# Patient Record
Sex: MALE | Race: BLACK OR AFRICAN AMERICAN | Employment: UNEMPLOYED | URBAN - METROPOLITAN AREA
[De-identification: names, ages, dates, MRNs, and addresses within clinical notes are randomized per-mention and may not be internally consistent; named-entity substitution may affect disease eponyms.]

---

## 2020-02-09 ENCOUNTER — HOSPITAL ENCOUNTER (EMERGENCY)
Age: 20
Discharge: HOME | End: 2020-02-09

## 2020-02-09 ENCOUNTER — APPOINTMENT (OUTPATIENT)
Dept: CT IMAGING | Age: 20
End: 2020-02-09

## 2020-02-09 VITALS
SYSTOLIC BLOOD PRESSURE: 121 MMHG | HEART RATE: 80 BPM | WEIGHT: 160.94 LBS | BODY MASS INDEX: 22.53 KG/M2 | RESPIRATION RATE: 17 BRPM | HEIGHT: 71 IN | OXYGEN SATURATION: 100 % | TEMPERATURE: 98.2 F | DIASTOLIC BLOOD PRESSURE: 68 MMHG

## 2020-02-09 LAB
A/G RATIO: 1.5 (ref 1.1–2.2)
ALBUMIN SERPL-MCNC: 5 G/DL (ref 3.4–5)
ALP BLD-CCNC: 70 U/L (ref 40–129)
ALT SERPL-CCNC: 19 U/L (ref 10–40)
AMPHETAMINE SCREEN, URINE: ABNORMAL
ANION GAP SERPL CALCULATED.3IONS-SCNC: 13 MMOL/L (ref 3–16)
AST SERPL-CCNC: 23 U/L (ref 15–37)
BARBITURATE SCREEN URINE: ABNORMAL
BASOPHILS ABSOLUTE: 0.1 K/UL (ref 0–0.2)
BASOPHILS RELATIVE PERCENT: 1.4 %
BENZODIAZEPINE SCREEN, URINE: ABNORMAL
BILIRUB SERPL-MCNC: 0.5 MG/DL (ref 0–1)
BILIRUBIN URINE: NEGATIVE
BLOOD, URINE: NEGATIVE
BUN BLDV-MCNC: 7 MG/DL (ref 7–20)
CALCIUM SERPL-MCNC: 9.3 MG/DL (ref 8.3–10.6)
CANNABINOID SCREEN URINE: POSITIVE
CHLORIDE BLD-SCNC: 105 MMOL/L (ref 99–110)
CLARITY: CLEAR
CO2: 26 MMOL/L (ref 21–32)
COCAINE METABOLITE SCREEN URINE: ABNORMAL
COLOR: YELLOW
CREAT SERPL-MCNC: 1 MG/DL (ref 0.9–1.3)
EOSINOPHILS ABSOLUTE: 0.1 K/UL (ref 0–0.6)
EOSINOPHILS RELATIVE PERCENT: 2.3 %
ETHANOL: 253 MG/DL (ref 0–0.08)
GFR AFRICAN AMERICAN: >60
GFR NON-AFRICAN AMERICAN: >60
GLOBULIN: 3.3 G/DL
GLUCOSE BLD-MCNC: 103 MG/DL (ref 70–99)
GLUCOSE URINE: NEGATIVE MG/DL
HCT VFR BLD CALC: 46.6 % (ref 40.5–52.5)
HEMOGLOBIN: 14.8 G/DL (ref 13.5–17.5)
KETONES, URINE: NEGATIVE MG/DL
LACTIC ACID: 2 MMOL/L (ref 0.4–2)
LEUKOCYTE ESTERASE, URINE: NEGATIVE
LYMPHOCYTES ABSOLUTE: 1.7 K/UL (ref 1–5.1)
LYMPHOCYTES RELATIVE PERCENT: 29.1 %
Lab: ABNORMAL
MCH RBC QN AUTO: 24.2 PG (ref 26–34)
MCHC RBC AUTO-ENTMCNC: 31.8 G/DL (ref 31–36)
MCV RBC AUTO: 76 FL (ref 80–100)
METHADONE SCREEN, URINE: ABNORMAL
MICROSCOPIC EXAMINATION: NORMAL
MONOCYTES ABSOLUTE: 0.4 K/UL (ref 0–1.3)
MONOCYTES RELATIVE PERCENT: 6.4 %
NEUTROPHILS ABSOLUTE: 3.6 K/UL (ref 1.7–7.7)
NEUTROPHILS RELATIVE PERCENT: 60.8 %
NITRITE, URINE: NEGATIVE
OPIATE SCREEN URINE: ABNORMAL
OXYCODONE URINE: ABNORMAL
PDW BLD-RTO: 15.8 % (ref 12.4–15.4)
PH UA: 7.5
PH UA: 7.5 (ref 5–8)
PHENCYCLIDINE SCREEN URINE: ABNORMAL
PLATELET # BLD: 276 K/UL (ref 135–450)
PMV BLD AUTO: 8.5 FL (ref 5–10.5)
POTASSIUM REFLEX MAGNESIUM: 4.2 MMOL/L (ref 3.5–5.1)
PROPOXYPHENE SCREEN: ABNORMAL
PROTEIN UA: NEGATIVE MG/DL
RBC # BLD: 6.13 M/UL (ref 4.2–5.9)
SODIUM BLD-SCNC: 144 MMOL/L (ref 136–145)
SPECIFIC GRAVITY UA: 1.01 (ref 1–1.03)
TOTAL PROTEIN: 8.3 G/DL (ref 6.4–8.2)
URINE REFLEX TO CULTURE: NORMAL
URINE TYPE: NORMAL
UROBILINOGEN, URINE: 0.2 E.U./DL
WBC # BLD: 5.9 K/UL (ref 4–11)

## 2020-02-09 PROCEDURE — 85025 COMPLETE CBC W/AUTO DIFF WBC: CPT

## 2020-02-09 PROCEDURE — 80307 DRUG TEST PRSMV CHEM ANLYZR: CPT

## 2020-02-09 PROCEDURE — G0480 DRUG TEST DEF 1-7 CLASSES: HCPCS

## 2020-02-09 PROCEDURE — 2580000003 HC RX 258: Performed by: PHYSICIAN ASSISTANT

## 2020-02-09 PROCEDURE — 6370000000 HC RX 637 (ALT 250 FOR IP): Performed by: PHYSICIAN ASSISTANT

## 2020-02-09 PROCEDURE — 96374 THER/PROPH/DIAG INJ IV PUSH: CPT

## 2020-02-09 PROCEDURE — 70450 CT HEAD/BRAIN W/O DYE: CPT

## 2020-02-09 PROCEDURE — 96376 TX/PRO/DX INJ SAME DRUG ADON: CPT

## 2020-02-09 PROCEDURE — 6360000002 HC RX W HCPCS: Performed by: PHYSICIAN ASSISTANT

## 2020-02-09 PROCEDURE — 36415 COLL VENOUS BLD VENIPUNCTURE: CPT

## 2020-02-09 PROCEDURE — 99284 EMERGENCY DEPT VISIT MOD MDM: CPT

## 2020-02-09 PROCEDURE — 81003 URINALYSIS AUTO W/O SCOPE: CPT

## 2020-02-09 PROCEDURE — 80053 COMPREHEN METABOLIC PANEL: CPT

## 2020-02-09 PROCEDURE — 83605 ASSAY OF LACTIC ACID: CPT

## 2020-02-09 RX ORDER — 0.9 % SODIUM CHLORIDE 0.9 %
1000 INTRAVENOUS SOLUTION INTRAVENOUS ONCE
Status: COMPLETED | OUTPATIENT
Start: 2020-02-09 | End: 2020-02-09

## 2020-02-09 RX ORDER — ACETAMINOPHEN 500 MG
1000 TABLET ORAL ONCE
Status: COMPLETED | OUTPATIENT
Start: 2020-02-09 | End: 2020-02-09

## 2020-02-09 RX ORDER — ONDANSETRON 2 MG/ML
4 INJECTION INTRAMUSCULAR; INTRAVENOUS ONCE
Status: COMPLETED | OUTPATIENT
Start: 2020-02-09 | End: 2020-02-09

## 2020-02-09 RX ADMIN — ONDANSETRON 4 MG: 2 INJECTION INTRAMUSCULAR; INTRAVENOUS at 11:38

## 2020-02-09 RX ADMIN — ONDANSETRON 4 MG: 2 INJECTION INTRAMUSCULAR; INTRAVENOUS at 06:22

## 2020-02-09 RX ADMIN — ACETAMINOPHEN 1000 MG: 500 TABLET ORAL at 11:38

## 2020-02-09 RX ADMIN — SODIUM CHLORIDE 1000 ML: 9 INJECTION, SOLUTION INTRAVENOUS at 06:22

## 2020-02-09 ASSESSMENT — PAIN SCALES - GENERAL: PAINLEVEL_OUTOF10: 8

## 2020-02-09 NOTE — ED PROVIDER NOTES
[address] [facility]      Pt Name: Cheri Almonte  MRN: [id number]  [place] 2000  Date of evaluation: 2/9/2020  Provider: Lannie Bence, PA-C    This patient was not seen and evaluated by the attending physician No att. providers found. CHIEF COMPLAINT       Chief Complaint   Patient presents with    Emesis    Shaking         HISTORYOF PRESENT ILLNESS  (Location/Symptom, Timing/Onset, Context/Setting, Quality, Duration, Modifying Factors, Severity.)   Cheri Almonte is a 22 y.o. male who presents to the emergency department with altered mental status. The patient arrives via EMS accompanied by his girlfriend who provides the entire history. She states he is intoxicated and drank alcohol from 1-4 AM. She called the paramedics because his speech was slurred, body was tremulous and he was \"frothing at the mouth\". He also fell to the ground. No LOC. Upon paramedic arrival, they deny any seizure activity. No interventions were given. He is sleeping now and wakes to verbal stimuli and sternal rub but does not stay awake long enough to answer questions or follow commands. The patient's girlfriend denies seizure activity today, but interestingly notes that she believes he had a seizure a week ago. At that time his entire body was shaking and he was unresponsive during the episode. It took his 5 minutes to regain full consciousness and answer questions. Prior to this he had never had a seizure as far as she knows. He does have a history of schizophrenia but does not take the medication he was prescribed for it. Nursing Notes were reviewedand I agree. REVIEW OF SYSTEMS    (2-9 systems for level 4, 10 or more forlevel 5)     Review of Systems   Unable to perform ROS: Mental status change   Patient does not wake long enough to answer questions  Except as noted above the remainder ofthe review of systems was reviewed and negative.        PAST assess due to patient condition            DIAGNOSTIC RESULTS     RADIOLOGY:   Non-plain film images such as CT, Ultrasound and MRI are read by the radiologist.Plain radiographic images are visualized and preliminarily interpreted by Stephany Knutson PA-C with the below findings:        Interpretation per the Radiologist below, if available at the time of this note:    CT Head WO Contrast   Final Result   No acute intracranial abnormality.              LABS:  Labs Reviewed   CBC WITH AUTO DIFFERENTIAL - Abnormal; Notable for the following components:       Result Value    RBC 6.13 (*)     MCV 76.0 (*)     MCH 24.2 (*)     RDW 15.8 (*)     All other components within normal limits    Narrative:     Performed at:  [address] [place] [address]   Phone [phone]   COMPREHENSIVE METABOLIC PANEL W/ REFLEX TO MG FOR LOW K - Abnormal; Notable for the following components:    Glucose 103 (*)     Total Protein 8.3 (*)     All other components within normal limits    Narrative:     Performed at:  [address] [place] [address]   Phone [phone]   [address] - Abnormal; Notable for the following components:    Cannabinoid Scrn, Ur POSITIVE (*)     All other components within normal limits    Narrative:     Performed at:  [facility]  [address] [place] [address]   Phone [phone]   ETHANOL    Narrative:     Performed at:  [facility]  [address] [address] [place] [address]   Phone [phone]   LACTIC ACID, PLASMA    Narrative:     Performed at:  [facility]  [address] [place] [address]   Phone [phone]   URINE RT REFLEX TO CULTURE    Narrative:     Performed at:  [facility]  [address] [place] [address] [place] [address]   Phone [phone]       All other labs were within normal range or not returned as of this dictation. EMERGENCY DEPARTMENT COURSE and DIFFERENTIAL DIAGNOSIS/MDM:   Vitals:    Vitals:    02/09/20 0830 02/09/20 0900 02/09/20 1030 02/09/20 1100   BP: 109/61 126/73 122/68 122/69   Pulse: 88 82 80 69   Resp: 21 19 20 25   Temp:       TempSrc:       SpO2: 99% 100% 97% 100%   Weight:       Height:            I have evaluated this patient. My supervising physician was available for consultation. Patient is clearly intoxicated. During his stay he got out of bed and urinated on the ground. He was given a liter of fluids and Zofran. His work-up is thus far normal.  His lactic acid is 2 and I have no suspicion for seizure activity given this. Hemoglobin is stable at 14.8. He has a normal white blood cell count at 5.9. Electrolytes are normal.  Renal function normal.  Ethanol level is 253. CT head was obtained secondary to possible trauma when he fell and this was negative. Currently waiting on the patient to provide a urine sample for a urine tox screen. The patient will be observed in the emergency department until he is sober enough to go home safely. Update 11:19 AM the patient was able to provide a urine sample and this is being sent. He complained of some further nausea and was given a second dose of IV Zofran, and also complained of a headache so Tylenol was ordered. The patient is now awake and alert and oriented x3. He knows he is here because \"I was drunk\". He is now requesting discharge. I think that is fine as long as he is able to ambulate. I will have our nursing staff ambulate the patient and as long as he is steady on his feet he will be discharged in the care of his friend who is with him. Urine tox screen positive for cannibinoid use. UA negative for ketones or infection. Discussed results, diagnosis and plan with patient and/or family. Questions addressed.  Dispositionand follow-up

## 2020-02-09 NOTE — ED TRIAGE NOTES
Girlfriend at  states pt had some tremors after a night of drinking with friends, states pt also had some emesis. Girlfriend is concerned patient might of had a seizure. Pt has no history of seizures. Pt is noncompliant with schizophrenia medications.

## 2020-10-28 ENCOUNTER — HOSPITAL ENCOUNTER (EMERGENCY)
Age: 20
Discharge: HOME | End: 2020-10-28
Attending: EMERGENCY MEDICINE
Payer: COMMERCIAL

## 2020-10-28 ENCOUNTER — APPOINTMENT (OUTPATIENT)
Dept: GENERAL RADIOLOGY | Age: 20
End: 2020-10-28
Payer: COMMERCIAL

## 2020-10-28 VITALS
DIASTOLIC BLOOD PRESSURE: 78 MMHG | TEMPERATURE: 98.7 F | OXYGEN SATURATION: 99 % | WEIGHT: 152.78 LBS | BODY MASS INDEX: 21.31 KG/M2 | HEART RATE: 97 BPM | RESPIRATION RATE: 18 BRPM | SYSTOLIC BLOOD PRESSURE: 129 MMHG

## 2020-10-28 PROCEDURE — 99282 EMERGENCY DEPT VISIT SF MDM: CPT

## 2020-10-28 ASSESSMENT — PAIN DESCRIPTION - PAIN TYPE: TYPE: ACUTE PAIN

## 2020-10-28 ASSESSMENT — PAIN SCALES - GENERAL: PAINLEVEL_OUTOF10: 8

## 2020-10-28 ASSESSMENT — PAIN DESCRIPTION - ORIENTATION: ORIENTATION: LEFT

## 2020-10-28 ASSESSMENT — PAIN DESCRIPTION - DESCRIPTORS: DESCRIPTORS: ACHING;THROBBING

## 2020-10-28 ASSESSMENT — PAIN DESCRIPTION - LOCATION: LOCATION: SHOULDER

## 2020-10-28 NOTE — ED PROVIDER NOTES
Social Needs    Financial resource strain: None    Food insecurity     Worry: None     Inability: None    Transportation needs     Medical: None     Non-medical: None   Tobacco Use    Smoking status: Current Every Day Smoker     Types: Cigars    Smokeless tobacco: Never Used   Substance and Sexual Activity    Alcohol use: Yes     Alcohol/week: 2.0 standard drinks     Types: 2 Shots of liquor per week    Drug use: No    Sexual activity: Yes     Partners: Female   Lifestyle    Physical activity     Days per week: None     Minutes per session: None    Stress: None   Relationships    Social connections     Talks on phone: None     Gets together: None     Attends Yazdanism service: None     Active member of club or organization: None     Attends meetings of clubs or organizations: None     Relationship status: None    Intimate partner violence     Fear of current or ex partner: None     Emotionally abused: None     Physically abused: None     Forced sexual activity: None   Other Topics Concern    None   Social History Narrative    None       SCREENINGS             PHYSICAL EXAM    (up to 7 for level 4, 8 ormore for level 5)     ED Triage Vitals [10/28/20 1651]   BP Temp Temp Source Pulse Resp SpO2 Height Weight   129/78 98.7 °F (37.1 °C) Oral 97 18 99 % -- 152 lb 12.5 oz (69.3 kg)       Physical Exam  Vitals signs and nursing note reviewed. Constitutional:       General: He is not in acute distress. Appearance: He is well-developed. He is not ill-appearing, toxic-appearing or diaphoretic. Comments: Well-appearing, nontoxic, not in acute distress. Answers questions in full sentences and following verbal commands appropriately   HENT:      Head: Normocephalic and atraumatic. Eyes:      General:         Right eye: No discharge. Left eye: No discharge. Conjunctiva/sclera: Conjunctivae normal.   Neck:      Musculoskeletal: Normal range of motion and neck supple.    Pulmonary: Effort: Pulmonary effort is normal. No respiratory distress. Musculoskeletal:      Left shoulder: He exhibits decreased range of motion, tenderness and pain. He exhibits no crepitus, no deformity and normal pulse. Skin:     Coloration: Skin is not pale. Neurological:      Mental Status: He is alert and oriented to person, place, and time. Psychiatric:         Behavior: Behavior normal. Behavior is cooperative. DIAGNOSTIC RESULTS     EKG: All EKG's are interpreted by the Emergency Department Physicianwho either signs or Co-signs this chart in the absence of a cardiologist.      RADIOLOGY:   Non-plain film images such as CT, Ultrasound and MRI are read by the radiologist. Plain radiographic images are visualized and preliminarily interpreted by the emergency physician with the below findings:      Interpretation per the Radiologist below, if available at the time of this note:    No orders to display         ED BEDSIDE ULTRASOUND:   Performed by ED Physician - none    LABS:  Labs Reviewed - No data to display    All other labs were within normal range ornot returned as of this dictation. EMERGENCY DEPARTMENT COURSE and DIFFERENTIAL DIAGNOSIS/MDM:   Vitals:    Vitals:    10/28/20 1651   BP: 129/78   Pulse: 97   Resp: 18   Temp: 98.7 °F (37.1 °C)   TempSrc: Oral   SpO2: 99%   Weight: 152 lb 12.5 oz (69.3 kg)         MDM    ED COURSE/MDM    -Juju Rios is a 23 y.o. male with significant medical history presents ED for left shoulder pain. Patient states he was wrestling with his brother 2 days ago when he injured it. Is concerned for a shoulder dislocation as he states that he is unable to full range of motion to his left shoulder without it causing pain. On examination patient able to extend shoulder up to 90 degrees however is unable to do so beyond that due to pain. Admitted abduction though no issues with abduction. Intact external and internal rotation. No obvious deformity noted. -Ordered 600 mg Motrin. -Xray of left shoulder ordered  -Nurse Informs me that patient is no longer in his room around 1800. Had not returned by 21 [phone] and therefore marked as eloped. REASSESSMENT      Eloped prior to re-evaluation    CRITICAL CARE TIME   Total Critical Care time was 0 minutes, excluding separately reportableprocedures. There was a high probability of clinicallysignificant/life threatening deterioration in the patient's condition which required my urgent intervention. CONSULTS:  None    PROCEDURES:  Unless otherwise noted below, none     Procedures    FINAL IMPRESSION      1. Acute pain of left shoulder          DISPOSITION/PLAN   DISPOSITION Eloped - Left Before Treatment Complete 10/28/2020 06:22:16 PM      PATIENT REFERREDTO:  No follow-up provider specified.     DISCHARGE MEDICATIONS:  Discharge Medication List as of 10/28/2020  6:24 PM             (Please note that portions of this note were completed with a voice recognition program.  Efforts were made to edit the dictations but occasionally wordsare mis-transcribed.)    Fifi Martinez MD (electronically signed)  Attending Emergency Physician            Fifi Martinez MD  10/28/20 2545

## 2021-06-21 ENCOUNTER — HOSPITAL ENCOUNTER (EMERGENCY)
Age: 21
Discharge: HOME | End: 2021-06-21
Attending: EMERGENCY MEDICINE
Payer: COMMERCIAL

## 2021-06-21 ENCOUNTER — APPOINTMENT (OUTPATIENT)
Dept: CT IMAGING | Age: 21
End: 2021-06-21
Payer: COMMERCIAL

## 2021-06-21 VITALS
HEART RATE: 90 BPM | RESPIRATION RATE: 18 BRPM | DIASTOLIC BLOOD PRESSURE: 100 MMHG | OXYGEN SATURATION: 100 % | SYSTOLIC BLOOD PRESSURE: 138 MMHG | BODY MASS INDEX: 20.92 KG/M2 | TEMPERATURE: 98.8 F | WEIGHT: 150 LBS

## 2021-06-21 DIAGNOSIS — S01.81XA FOREHEAD LACERATION, INITIAL ENCOUNTER: Primary | ICD-10-CM

## 2021-06-21 DIAGNOSIS — S09.90XA INJURY OF HEAD, INITIAL ENCOUNTER: ICD-10-CM

## 2021-06-21 PROCEDURE — 12002 RPR S/N/AX/GEN/TRNK2.6-7.5CM: CPT

## 2021-06-21 PROCEDURE — 72125 CT NECK SPINE W/O DYE: CPT

## 2021-06-21 PROCEDURE — 99284 EMERGENCY DEPT VISIT MOD MDM: CPT

## 2021-06-21 PROCEDURE — 70486 CT MAXILLOFACIAL W/O DYE: CPT

## 2021-06-21 PROCEDURE — 12011 RPR F/E/E/N/L/M 2.5 CM/<: CPT

## 2021-06-21 PROCEDURE — 70450 CT HEAD/BRAIN W/O DYE: CPT

## 2021-06-21 NOTE — ED NOTES
Pressure dressing applied after verifying with [employer] that is possibly permissible, patient tolerated procedure well, reviewed instructions with [employer], very under, to brian per [employer]     Carl Moffett RN  06/21/21 9344

## 2021-06-21 NOTE — ED PROVIDER NOTES
[address]  Chief Complaint   Patient presents with    Laceration     head, hit head against partition of police car, patient arrives to ER with spit shield in place, initially patient refuse treatment, EMD to bedside, patient finally agree, lac noted to top of forehead, denies LOC     HISTORY OF PRESENT ILLNESS  Claude Rao is a 19 y.o. male who presents to the ED complaining of frontal scalp laceration and forehead laceration. He arrives in police custody, initially agitated with a spit shield in place and initially refusing treatment but was agreeable when I evaluated him to let me evaluate his wounds and calmed down significantly. He does admit to alcohol and marijuana use to me tonight but denies any other illicit drug use. He is currently under arrest per the police who are with him. His tetanus is up-to-date. He sustained the injury from apparently hitting his head on the partition in the police car x2 sustaining a laceration with each time he hit his head. No loss of consciousness or vomiting. No other complaints, modifying factors or associated symptoms. Nursing notes reviewed. Past Medical History:   Diagnosis Date    Asthma      History reviewed. No pertinent surgical history. History reviewed. No pertinent family history. Social History     Socioeconomic History    Marital status: Single     Spouse name: Not on file    Number of children: Not on file    Years of education: Not on file    Highest education level: Not on file   Occupational History    Not on file   Tobacco Use    Smoking status: Current Every Day Smoker     Types: Cigars    Smokeless tobacco: Never Used   Vaping Use    Vaping Use: Never used   Substance and Sexual Activity    Alcohol use:  Yes     Alcohol/week: 2.0 standard drinks     Types: 2 Shots of liquor per week    Drug use: No    Sexual activity: Yes     Partners: Female   Other Topics Concern  Not on file   Social History Narrative    Not on file     Social Determinants of Health     Financial Resource Strain:     Difficulty of Paying Living Expenses:    Food Insecurity:     Worried About Running Out of Food in the Last Year:     [address] in the Last Year:    Transportation Needs:     Lack of Transportation (Medical):  Lack of Transportation (Non-Medical):    Physical Activity:     Days of Exercise per Week:     Minutes of Exercise per Session:    Stress:     Feeling of Stress :    Social Connections:     Frequency of Communication with Friends and Family:     Frequency of Social Gatherings with Friends and Family:     Attends Gnosticism Services:     Active Member of Clubs or Organizations:     Attends Club or Organization Meetings:     Marital Status:    Intimate Partner Violence:     Fear of Current or Ex-Partner:     Emotionally Abused:     Physically Abused:     Sexually Abused:      No current facility-administered medications for this encounter. Current Outpatient Medications   Medication Sig Dispense Refill    ibuprofen (ADVIL;MOTRIN) 600 MG tablet Take 1 tablet by mouth every 6 hours as needed for Pain 20 tablet 0     No Known Allergies    REVIEW OF SYSTEMS  6 systems reviewed, pertinent positives per HPI otherwise noted to be negative    PHYSICAL EXAM   BP (!) 138/100   Pulse 90   Temp 98.8 °F (37.1 °C) (Oral)   Resp 18   Wt 150 lb (68 kg)   SpO2 100%   BMI 20.92 kg/m²    GENERAL APPEARANCE: Awake and alert. Cooperative. No acute distress. HEAD: Normocephalic. 2.5 cm superficial transverse laceration noted to the middle of the forehead with no active bleeding. Superior to this near the hairline is a 5.5 cm superficial laceration with one small arterial component of bleeding noted, controlled with pressure initially, also transverse along the hairline. No other craniofacial lacerations are apparent. He has no bony tenderness on the face.   He does review. Dose modulation, iterative reconstruction, and/or weight based adjustment of the mA/kV was utilized to reduce the radiation dose to as low as reasonably achievable. COMPARISON: None. HISTORY: ORDERING SYSTEM PROVIDED HISTORY: frontal trauma TECHNOLOGIST PROVIDED HISTORY: Reason for exam:->frontal trauma Has a \"code stroke\" or \"stroke alert\" been called? ->No Decision Support Exception - unselect if not a suspected or confirmed emergency medical condition->Emergency Medical Condition (MA) Reason for Exam: Frontal trauma - Best images possible Acuity: Acute Type of Exam: Subsequent/Follow-up Mechanism of Injury: Laceration (head, hit head against partition of police car, patient arrives to ER with spit shield in place, initially patient refuse treatment, EMD to bedside, patient finally agree, lac noted to top of forehead, denies LOC) Relevant Medical/Surgical History: Current Every Day Smoker; 2.0 standard drinks of alcohol/week. No hx of any relevant surgeries or of any cancer. FINDINGS: Head and facial bones: BRAIN/VENTRICLES: There is no acute intracranial hemorrhage, mass effect or midline shift. No abnormal extra-axial fluid collection. The gray-white differentiation is maintained without evidence of an acute infarct. There is no evidence of hydrocephalus. ORBITS: The bilateral globes are intact. SINUSES: Mild mucoperiosteal thickening of the right ethmoid air cells is seen. Other sinuses and mastoid air cells are clear. SOFT TISSUES/BONES:  No acute fracture or dislocation in the facial and cranial bones. Diffuse right frontoparietal scalp swelling/hematoma. No acute intracranial abnormality. No acute fracture or dislocation in the facial and cranial bones. No acute fracture or subluxation in the cervical spine.      CT FACIAL BONES WO CONTRAST    Result Date: 6/21/2021  EXAMINATION: CT OF THE HEAD WITHOUT CONTRAST; CT OF THE FACE WITHOUT CONTRAST; CT OF THE CERVICAL SPINE WITHOUT CONTRAST 6/21/2021 2:07 am TECHNIQUE: CT of the head was performed without the administration of intravenous contrast. Dose modulation, iterative reconstruction, and/or weight based adjustment of the mA/kV was utilized to reduce the radiation dose to as low as reasonably achievable.; CT of the face was performed without the administration of intravenous contrast. Multiplanar reformatted images are provided for review. Dose modulation, iterative reconstruction, and/or weight based adjustment of the mA/kV was utilized to reduce the radiation dose to as low as reasonably achievable.; CT of the cervical spine was performed without the administration of intravenous contrast. Multiplanar reformatted images are provided for review. Dose modulation, iterative reconstruction, and/or weight based adjustment of the mA/kV was utilized to reduce the radiation dose to as low as reasonably achievable. COMPARISON: None. HISTORY: ORDERING SYSTEM PROVIDED HISTORY: frontal trauma TECHNOLOGIST PROVIDED HISTORY: Reason for exam:->frontal trauma Has a \"code stroke\" or \"stroke alert\" been called? ->No Decision Support Exception - unselect if not a suspected or confirmed emergency medical condition->Emergency Medical Condition (MA) Reason for Exam: Frontal trauma - Best images possible Acuity: Acute Type of Exam: Subsequent/Follow-up Mechanism of Injury: Laceration (head, hit head against partition of police car, patient arrives to ER with spit shield in place, initially patient refuse treatment, EMD to bedside, patient finally agree, lac noted to top of forehead, denies LOC) Relevant Medical/Surgical History: Current Every Day Smoker; 2.0 standard drinks of alcohol/week. No hx of any relevant surgeries or of any cancer. FINDINGS: Head and facial bones: BRAIN/VENTRICLES: There is no acute intracranial hemorrhage, mass effect or midline shift. No abnormal extra-axial fluid collection.   The gray-white differentiation is maintained without evidence of an acute infarct. There is no evidence of hydrocephalus. ORBITS: The bilateral globes are intact. SINUSES: Mild mucoperiosteal thickening of the right ethmoid air cells is seen. Other sinuses and mastoid air cells are clear. SOFT TISSUES/BONES:  No acute fracture or dislocation in the facial and cranial bones. Diffuse right frontoparietal scalp swelling/hematoma. No acute intracranial abnormality. No acute fracture or dislocation in the facial and cranial bones. No acute fracture or subluxation in the cervical spine. CT CERVICAL SPINE WO CONTRAST    Result Date: 6/21/2021  EXAMINATION: CT OF THE HEAD WITHOUT CONTRAST; CT OF THE FACE WITHOUT CONTRAST; CT OF THE CERVICAL SPINE WITHOUT CONTRAST  6/21/2021 2:07 am TECHNIQUE: CT of the head was performed without the administration of intravenous contrast. Dose modulation, iterative reconstruction, and/or weight based adjustment of the mA/kV was utilized to reduce the radiation dose to as low as reasonably achievable.; CT of the face was performed without the administration of intravenous contrast. Multiplanar reformatted images are provided for review. Dose modulation, iterative reconstruction, and/or weight based adjustment of the mA/kV was utilized to reduce the radiation dose to as low as reasonably achievable.; CT of the cervical spine was performed without the administration of intravenous contrast. Multiplanar reformatted images are provided for review. Dose modulation, iterative reconstruction, and/or weight based adjustment of the mA/kV was utilized to reduce the radiation dose to as low as reasonably achievable. COMPARISON: None. HISTORY: ORDERING SYSTEM PROVIDED HISTORY: frontal trauma TECHNOLOGIST PROVIDED HISTORY: Reason for exam:->frontal trauma Has a \"code stroke\" or \"stroke alert\" been called? ->No Decision Support Exception - unselect if not a suspected or confirmed emergency medical condition->Emergency Medical Condition (MA) Reason for Secondary survey did not reveal any other significant injuries. The wounds were cleaned and closed per the procedure note below. CT of the head, facial bones and cervical spine were all unremarkable. Sutures out in about 10 days. No indication for empiric antibiotics. Laceration Repair Procedure Note    Indication: Laceration(s)    Consent: The patient was counseled regarding the procedure, it's indications, risks, potential complications and alternatives and any questions were answered. Consent was obtained. Location: Forehead (2.5cm) and along the frontal hairline (5.5cm)    Shape: Forehead (linear) and frontal hairline (curvilinear)    Procedure: The patient was placed in the appropriate position and anesthesia around the lacerations were obtained by infiltration using 1% Lidocaine without epinephrine. The area was then cleaned with hibiclens and draped in a sterile fashion. The wound was then irrigated with normal saline. The wound was fully explored in a bloodless field and no foreign bodies were found. The FOREHEAD laceration was closed with 4-0 ethilon using interrupted sutures, x3. The SUPERIOR FRONTAL HAIRLINE laceration was closed with 4-0 ethilon using interrupted sutures, x5. The wound area was then dressed with gauze and pressure bandage    Total repaired wound length: 8 cm. For reference, laceration length cut-offs for billing purposes are  by lengths of 0-2.5cm, 2.6-5cm, 5.1-7.5cm, and 7.6-12.5cm. Wound classification:  intermediate due to single layer closure requiring copious irrigation    Suture/staple count: total suture count 8    The patient tolerated the procedure well. The patients tetanus status was up to date and did not require a booster dose. Complications: None        CLINICAL IMPRESSION  1. Forehead laceration, initial encounter    2.  Injury of head, initial encounter        Blood pressure (!) 138/100, pulse 90, temperature 98.8 °F (37.1 °C), temperature source Oral, resp. rate 18, weight 150 lb (68 kg), SpO2 100 %. DISPOSITION    I have discussed the findings of today's workup with the patient and addressed the patient's questions and concerns. Important warning signs as well as new or worsening symptoms which would necessitate immediate return to the ED were discussed. The plan is to discharge from the ED at this time, and the patient is in stable condition. The patient acknowledged understanding is agreeable with this plan. Follow-up with:  [facility] [id number]  [phone]  Go to   If symptoms worsen    SUTURES OUT IN 10 DAYS            This chart was created using Dragon dictation software. Efforts were made by me to ensure accuracy, however some errors may be present due to limitations of this technology.         Felix Nelson MD  06/21/21 2284

## 2021-06-21 NOTE — ED NOTES
Return to room from [address] per stretcher, accompanied by [employer] x2      Cassy Aguilera RN  06/21/21 4707

## 2021-06-21 NOTE — ED NOTES
Suturing completed, patient tolerated procedure well, wounds cleansed, PSO applied     Jaja Busch RN  06/21/21 7162  Aware waiting for CT results     Jaja Busch RN  06/21/21 2420

## 2021-06-21 NOTE — ED TRIAGE NOTES
Nurse arrived to room wearing face mask, safety goggles and gloves, patient arrived wearing spit shield,

## 2024-10-23 ENCOUNTER — APPOINTMENT (OUTPATIENT)
Dept: CT IMAGING | Age: 24
End: 2024-10-23
Payer: COMMERCIAL

## 2024-10-23 ENCOUNTER — APPOINTMENT (OUTPATIENT)
Dept: GENERAL RADIOLOGY | Age: 24
End: 2024-10-23
Payer: COMMERCIAL

## 2024-10-23 ENCOUNTER — HOSPITAL ENCOUNTER (EMERGENCY)
Age: 24
Discharge: HOME | End: 2024-10-23
Attending: EMERGENCY MEDICINE
Payer: COMMERCIAL

## 2024-10-23 VITALS
RESPIRATION RATE: 17 BRPM | DIASTOLIC BLOOD PRESSURE: 73 MMHG | OXYGEN SATURATION: 99 % | SYSTOLIC BLOOD PRESSURE: 123 MMHG | HEART RATE: 69 BPM | TEMPERATURE: 97.6 F
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
## 2024-10-23 DIAGNOSIS — S09.90XD CLOSED HEAD INJURY, SUBSEQUENT ENCOUNTER: ICD-10-CM
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
## 2024-10-23 PROCEDURE — 90471 IMMUNIZATION ADMIN: CPT | Performed by: EMERGENCY MEDICINE

## 2024-10-23 PROCEDURE — 73060 X-RAY EXAM OF HUMERUS: CPT

## 2024-10-23 PROCEDURE — 6370000000 HC RX 637 (ALT 250 FOR IP): Performed by: EMERGENCY MEDICINE

## 2024-10-23 PROCEDURE — 72125 CT NECK SPINE W/O DYE: CPT

## 2024-10-23 PROCEDURE — 99284 EMERGENCY DEPT VISIT MOD MDM: CPT

## 2024-10-23 PROCEDURE — 90715 TDAP VACCINE 7 YRS/> IM: CPT | Performed by: EMERGENCY MEDICINE

## 2024-10-23 PROCEDURE — 70450 CT HEAD/BRAIN W/O DYE: CPT

## 2024-10-23 PROCEDURE — 6360000002 HC RX W HCPCS: Performed by: EMERGENCY MEDICINE

## 2024-10-23 RX ORDER — ACETAMINOPHEN 500 MG
1000 TABLET ORAL ONCE
Status: COMPLETED | OUTPATIENT
Start: 2024-10-23 | End: 2024-10-23

## 2024-10-23 RX ORDER — IBUPROFEN 600 MG/1
600 TABLET, FILM COATED ORAL 3 TIMES DAILY PRN
Qty: 30 TABLET | Refills: 0 | Status: SHIPPED | OUTPATIENT
Start: 2024-10-23

## 2024-10-23 RX ADMIN — AMOXICILLIN AND CLAVULANATE POTASSIUM 1 TABLET: 875; 125 TABLET, FILM COATED ORAL at 19:52

## 2024-10-23 RX ADMIN — ACETAMINOPHEN 1000 MG: 500 TABLET ORAL at 19:52

## 2024-10-23 RX ADMIN — TETANUS TOXOID, REDUCED DIPHTHERIA TOXOID AND ACELLULAR PERTUSSIS VACCINE, ADSORBED 0.5 ML: 5; 2.5; 8; 8; 2.5 SUSPENSION INTRAMUSCULAR at 19:58

## 2024-10-23 ASSESSMENT — PAIN DESCRIPTION - LOCATION: LOCATION: HEAD;ARM

## 2024-10-23 ASSESSMENT — ENCOUNTER SYMPTOMS
SHORTNESS OF BREATH: 0
GASTROINTESTINAL NEGATIVE: 1
ABDOMINAL PAIN: 0
EYES NEGATIVE: 1
BACK PAIN: 0
RESPIRATORY NEGATIVE: 1

## 2024-10-23 ASSESSMENT — PAIN - FUNCTIONAL ASSESSMENT: PAIN_FUNCTIONAL_ASSESSMENT: 0-10

## 2024-10-23 ASSESSMENT — LIFESTYLE VARIABLES
HOW MANY STANDARD DRINKS CONTAINING ALCOHOL DO YOU HAVE ON A TYPICAL DAY: PATIENT DOES NOT DRINK
HOW OFTEN DO YOU HAVE A DRINK CONTAINING ALCOHOL: MONTHLY OR LESS

## 2024-10-23 ASSESSMENT — PAIN SCALES - GENERAL: PAINLEVEL_OUTOF10: 10

## 2024-10-23 NOTE — ED PROVIDER NOTES
neck pain with signs of contusion to his left neck.  His neuroexam is unremarkable.  No chest or abdominal pain.  No concern for intra-abdominal or thoracic injury no signs of red flag or spinal cord injury at this time.  Normal strength sensation upper and lower extremity.  CT head and CT C-spine were obtained they are both negative for acute traumatic findings left humerus x-rays negative for foreign body or acute bony abnormality.  Patient was given Tylenol, Augmentin, tetanus.  Will give prescription for ibuprofen, Augmentin.  Strict return precautions for any new or worsening symptoms    CT Head-CT head was ordered to look for intracranial bleeding, masses, skull fracture, other intracranial abnormalities  CT C Spine- CT neck was ordered to look for acute cervical spine fracture, dislocation, deformity, other injury         Patient was given the following medications:  Medications   acetaminophen (TYLENOL) tablet 1,000 mg (1,000 mg Oral Given 10/23/24 1952)   amoxicillin-clavulanate (AUGMENTIN) 875-125 MG per tablet 1 tablet (1 tablet Oral Given 10/23/24 1952)   tetanus-diphth-acell pertussis (BOOSTRIX) injection 0.5 mL (0.5 mLs IntraMUSCular Given 10/23/24 1958)       Disposition Considerations (tests considered but not done, Shared Decision Making, Pt Expectation of Test or Tx.): Shared decision making with this patient: Initial differential diagnoses were discussed with this patient, along with physical exam findings and an explanation what evaluation studies were necessary and why. Labs and Imaging results were explained to the patient in detail, including explanation of what these results mean. All treatment and disposition options including admission or discharge were discussed with the patient and a treatment plan with the patient's best short and long term care was made in collaboration with the patient.     I estimate there is LOW risk for SKULL FRACTURE, SUBARACHNOID HEMORRHAGE, INTRACRANIAL